# Patient Record
Sex: MALE | Race: WHITE | Employment: FULL TIME | URBAN - NONMETROPOLITAN AREA
[De-identification: names, ages, dates, MRNs, and addresses within clinical notes are randomized per-mention and may not be internally consistent; named-entity substitution may affect disease eponyms.]

---

## 2020-06-04 ENCOUNTER — HOSPITAL ENCOUNTER (EMERGENCY)
Age: 35
Discharge: HOME | End: 2020-06-04

## 2020-06-04 VITALS
HEART RATE: 82 BPM | HEIGHT: 73 IN | OXYGEN SATURATION: 97 % | RESPIRATION RATE: 20 BRPM | DIASTOLIC BLOOD PRESSURE: 79 MMHG | BODY MASS INDEX: 34.46 KG/M2 | SYSTOLIC BLOOD PRESSURE: 132 MMHG | TEMPERATURE: 98.1 F | WEIGHT: 260 LBS

## 2020-06-04 PROCEDURE — 99202 OFFICE O/P NEW SF 15 MIN: CPT | Performed by: NURSE PRACTITIONER

## 2020-06-04 PROCEDURE — 99212 OFFICE O/P EST SF 10 MIN: CPT

## 2020-06-04 RX ORDER — CLOTRIMAZOLE 1 %
CREAM (GRAM) TOPICAL 2 TIMES DAILY
COMMUNITY

## 2020-06-04 RX ORDER — TERBINAFINE HYDROCHLORIDE 250 MG/1
250 TABLET ORAL DAILY
Qty: 15 TABLET | Refills: 0 | Status: SHIPPED | OUTPATIENT
Start: 2020-06-04

## 2020-06-04 NOTE — ED TRIAGE NOTES
Patient to room with c/o multiple red, crusty, circular rash to bilateral upper extremities beginning one month ago. States one area to bilateral thighs. States began on left elbow, spreading steadily over the last month. Intermittent itchiness.

## 2020-06-04 NOTE — ED PROVIDER NOTES
and dry. Capillary Refill: Capillary refill takes less than 2 seconds. Coloration: Skin is not pale. Findings: Erythema and rash present. No abrasion, ecchymosis, laceration or petechiae. Rash is not purpuric, pustular, urticarial or vesicular. Comments: Both arms and to the right upper leg. The patient has a circular dry scaly areas noted to both arms left elbow to the upper legs as well as on the left lower abdomen that seems to be resolving. The area has central clearing with dry borders (see see photo)   Neurological:      Mental Status: He is alert and oriented to person, place, and time. Psychiatric:         Mood and Affect: Mood normal.         Behavior: Behavior normal. Behavior is cooperative. DIAGNOSTIC RESULTS     Labs:No results found for this visit on 06/04/20. IMAGING:    No orders to display         EKG:      URGENT CARE COURSE:     Vitals:    06/04/20 1317   BP: 132/79   Pulse: 82   Resp: 20   Temp: 98.1 °F (36.7 °C)   TempSrc: Temporal   SpO2: 97%   Weight: 260 lb (117.9 kg)   Height: 6' 1\" (1.854 m)       Medications - No data to display         PROCEDURES:  None    FINAL IMPRESSION      1. Tinea corporis          DISPOSITION/ PLAN     Patient was advised to follow-up with a local provider for further evaluation and treatment. He was also advised to use topical moisturizers to help with some of the itch. Patient was advised if he has any concerns about medication usage to be reevaluated such as decrease in urine output, discolored urine body aches fever or chills. Patient is agreeable to the treatment plan and left ambulatory without any problems. PATIENT REFERRED TO:  No primary care provider on file. No primary physician on file.       DISCHARGE MEDICATIONS:  Discharge Medication List as of 6/4/2020  1:35 PM      START taking these medications    Details   terbinafine (LAMISIL) 250 MG tablet Take 1 tablet by mouth daily, Disp-15 tablet, R-0Normal             Discharge Medication List as of 6/4/2020  1:35 PM          Discharge Medication List as of 6/4/2020  1:35 PM          BETI Moody CNP    (Please note that portions of this note were completed with a voice recognition program. Efforts were made to edit the dictations but occasionally words are mis-transcribed.)           BETI Moody CNP  06/04/20 5404